# Patient Record
Sex: MALE | Race: WHITE | URBAN - METROPOLITAN AREA
[De-identification: names, ages, dates, MRNs, and addresses within clinical notes are randomized per-mention and may not be internally consistent; named-entity substitution may affect disease eponyms.]

---

## 2019-09-26 ENCOUNTER — HISTORICAL (OUTPATIENT)
Dept: ADMINISTRATIVE | Facility: HOSPITAL | Age: 34
End: 2019-09-26

## 2022-04-30 NOTE — OP NOTE
DATE OF SURGERY:    09/26/2019    SURGEON:  Nikita Murray MD    PREOPERATIVE DIAGNOSIS:  Scleral laceration with uveal prolapse, left eye.    POSTOPERATIVE DIAGNOSIS:  Scleral laceration with uveal prolapse, left eye.    OPERATIVE PROCEDURE:  Repair of scleral laceration with uveal prolapse, left eye.    ANESTHESIA:  LMA.    DESCRIPTION OF PROCEDURE:  The patient is approximately a 34-year-old male who was discharged from [facility] Room to my office with a history of a small scleral laceration.  After evaluation in my office, I decided to bring him to the operating room at [facility] for repair.  The patient was prepped and draped in sterile fashion with the exception of putting any type of Betadine in the eye.  Moxifloxacin eyedrops were copiously used to irrigate the eye topically prior to surgery.  The microscope was centered and focused in a temporal position and a small scleral laceration was observed at approximately the 8 o'clock position around the limbus.  Naren scissors and 0.2 forceps were used to create a small peritomy at the limbus and dissect the conjunctiva away from the incision so that the sclerae could be observed and the laceration could be observed in its entire anterior to posterior location.  It was basically a radial laceration approximately 4-5 mm in length with the exception of a small bend in the laceration, probably 30 degrees for approximately 1 mm at the corneal anterior end of the laceration.  Electrocautery was then used to gain hemostasis around the laceration.  A paracentesis port was created and __________ was injected into the anterior chamber without fully inflating it.  After several attempts of trying to massage the prolapsed iris back into the anterior chamber, it was obvious that an incision 180 degrees from the laceration would be required to allow pressure to escape, thereby facilitating the replacement of the prolapsed iris.   By using a cycle dialysis spatula and __________, I was able to coax the iris back into the anterior chamber and out of the incision and kept out of the way with repeated __________.  7-0 Vicryl sutures were then used in an interrupted fashion to suture the sclerae.  The tail end of the incision that was just adjacent to the corneal limbus was sutured using a 10-0 nylon suture.  The incision was checked for leaks, none were found.  The [place] cannula was then used to remove the remaining viscoelastic from the eye.  Miostat was used also during the process of replacing the prolapsed iris to constrict the pupil and keep the peripheral iris out of the wound.  The entire procedure went well without any bleeding or complications.  The conjunctiva was then closed over the incision using 6-0 plain gut sutures.  Two were used to close the conjunctiva.  The anterior chamber was well formed.  There was no hyphema and the eye was felt to be at physiologic IOP.  The incision made corneally with a keratome blade was hydrated using BSS, was checked for leaks, and none were found.  The operation proceeded well without any complications.  The patient tolerated the entire     procedure well as well.  The patient will be placed on moxifloxacin topical drops as well as ketorolac topical drops and followed up in 1 day.        ______________________________  MD LUIS North/BRIAN  DD:  10/01/2019  Time:  10:26AM  DT:  10/01/2019  Time:  10:52AM  Job #:  [id number]